# Patient Record
Sex: MALE | Race: BLACK OR AFRICAN AMERICAN | NOT HISPANIC OR LATINO | Employment: UNEMPLOYED | URBAN - METROPOLITAN AREA
[De-identification: names, ages, dates, MRNs, and addresses within clinical notes are randomized per-mention and may not be internally consistent; named-entity substitution may affect disease eponyms.]

---

## 2021-07-07 ENCOUNTER — MEDICAL CORRESPONDENCE (OUTPATIENT)
Dept: HEALTH INFORMATION MANAGEMENT | Facility: CLINIC | Age: 61
End: 2021-07-07

## 2021-07-07 ENCOUNTER — TRANSFERRED RECORDS (OUTPATIENT)
Dept: HEALTH INFORMATION MANAGEMENT | Facility: CLINIC | Age: 61
End: 2021-07-07

## 2021-07-08 ENCOUNTER — TRANSCRIBE ORDERS (OUTPATIENT)
Dept: OTHER | Age: 61
End: 2021-07-08

## 2021-07-08 DIAGNOSIS — G44.011 INTRACTABLE EPISODIC CLUSTER HEADACHE: ICD-10-CM

## 2021-07-08 DIAGNOSIS — R68.84 JAW PAIN: Primary | ICD-10-CM

## 2021-08-20 ENCOUNTER — HOSPITAL ENCOUNTER (EMERGENCY)
Facility: CLINIC | Age: 61
Discharge: HOME | End: 2021-08-20
Attending: EMERGENCY MEDICINE | Admitting: EMERGENCY MEDICINE
Payer: COMMERCIAL

## 2021-08-20 VITALS
SYSTOLIC BLOOD PRESSURE: 163 MMHG | DIASTOLIC BLOOD PRESSURE: 106 MMHG | HEART RATE: 98 BPM | OXYGEN SATURATION: 100 % | RESPIRATION RATE: 16 BRPM | TEMPERATURE: 96.6 F

## 2021-08-20 DIAGNOSIS — K08.89 PAIN, DENTAL: ICD-10-CM

## 2021-08-20 PROCEDURE — 99282 EMERGENCY DEPT VISIT SF MDM: CPT | Performed by: EMERGENCY MEDICINE

## 2021-08-20 ASSESSMENT — ENCOUNTER SYMPTOMS
NECK STIFFNESS: 0
DIFFICULTY URINATING: 0
EYE REDNESS: 0
SHORTNESS OF BREATH: 0
ARTHRALGIAS: 0
ABDOMINAL PAIN: 0
FEVER: 0
COLOR CHANGE: 0
CONFUSION: 0
HEADACHES: 1

## 2021-08-20 NOTE — DISCHARGE INSTRUCTIONS
Orajel or Anbesol to affected area. (You may obtain this from any pharmacy)  Tylenol or Ibuprofen for pain.  Use prescription medication as directed.  Follow up with your Dentist or a dental clinic listed below.    Many of these clinics offer a sliding fee option for patients that qualify, and see patients on a walk-in or same day basis. Please call each clinic directly. As services, hours, fees and policies vary greatly.    [place]:  [facility]     [phone]  [facility] ([facility]) [phone]  [facility]  [phone]  [facility]      [phone]   [facility]    [phone]  [facility]  [phone]  [facility] [phone]  [facility]     [phone]  [facility]   [phone]  [facility] (cash only)   [phone]  [facility]    [phone] (adults)          [phone] (children)    [place]:  [facility]     [phone]; [phone]  [facility]     [phone]  [facility]     [phone]  [facility] (free, limited)    [phone]    Multiple Locations:  [facility]       [phone]

## 2021-08-20 NOTE — ED PROVIDER NOTES
ED Provider Note  [facility]      History     Chief Complaint   Patient presents with     Dental Pain     3 weeks ago, molar removed. Went to clinic requesting other molar to be removed. Requesting tooth to be removed, right side pain.     The history is provided by the patient and medical records. A  was used (Official iPad German ).   Dental Pain  Associated symptoms: headaches    Associated symptoms: no congestion and no fever      Marcella Mac is a 61 year old male with a past medical history significant for hypertension, hyperlipidemia, prediabetes and depression who presents to the Emergency Department for evaluation of dental pain.  Patient states that 3 weeks ago he had one of his teeth removed.  He says he had an appointment today to take out another tooth but he was told there was no dentist available to remove his tooth.  He states he called 911 and was brought here to have his tooth removed.  He thinks he has inflammation around his tooth and he says he has had a lot of pain.  He says he has had tooth pain and a headache for 2 months.  He has taken ibuprofen and Tylenol for pain.  He states he wants his tooth to be extracted here in the ED.    Past Medical History  Past Medical History:   Diagnosis Date     Premature ejaculation      Past Surgical History:   Procedure Laterality Date     NO HISTORY OF SURGERY       IBUPROFEN PO      No Known Allergies  Family History  Family History   Family history unknown: Yes     Social History   Social History     Tobacco Use     Smoking status: Never Smoker     Smokeless tobacco: Never Used   Substance Use Topics     Alcohol use: No     Drug use: No      Past medical history, past surgical history, medications, allergies, family history, and social history were reviewed with the patient. No additional pertinent items.       Review of Systems   Constitutional: Negative for fever.   HENT: Negative for  congestion.         Positive for tooth pain   Eyes: Negative for redness.   Respiratory: Negative for shortness of breath.    Cardiovascular: Negative for chest pain.   Gastrointestinal: Negative for abdominal pain.   Genitourinary: Negative for difficulty urinating.   Musculoskeletal: Negative for arthralgias and neck stiffness.   Skin: Negative for color change.   Neurological: Positive for headaches.   Psychiatric/Behavioral: Negative for confusion.   All other systems reviewed and are negative.    A complete review of systems was performed with pertinent positives and negatives noted in the HPI, and all other systems negative.    Physical Exam   BP: (!) 163/106  Pulse: 98  Temp: (!) 96.6  F (35.9  C)  Resp: 16  SpO2: 100 %  Physical Exam  Constitutional:       General: He is not in acute distress.     Appearance: He is well-developed. He is not diaphoretic.   HENT:      Head: Normocephalic and atraumatic.      Mouth/Throat:     Eyes:      General: No scleral icterus.  Musculoskeletal:      Cervical back: Normal range of motion and neck supple.   Skin:     General: Skin is warm and dry.      Findings: No rash.   Neurological:      Mental Status: He is alert and oriented to person, place, and time.         ED Course     1:33 PM  The patient was seen and examined by Austen Mcduffie MD in Room ED02.   Procedures       The medical record was reviewed and interpreted.       No results found for any visits on 08/20/21.  Medications - No data to display     Assessments & Plan (with Medical Decision Making)   61-year-old male who presents for evaluation of right upper tooth pain for the past 2 months requesting extraction.  Differential includes caries, reversible versus irreversible pulpitis, alveolitis, dental abscess.  Exam reveals tooth #5 be rotted down to the gumline.  There is no fluctuance or obvious swelling.  Patient was quite angry that we could not extract his tooth in the emergency room.  He declined pain meds  medications and antibiotics.  He was given a list of dentists in the area.    I have reviewed the nursing notes. I have reviewed the findings, diagnosis, plan and need for follow up with the patient.    Discharge Medication List as of 8/20/2021  2:12 PM          Final diagnoses:   Pain, dental     I, Chetna Layton, am serving as a trained medical scribe to document services personally performed by Austen Mcduffie MD, based on the provider's statements to me.     I, Austen Mcduffie MD, was physically present and have reviewed and verified the accuracy of this note documented by Chetna Layton.  --  Austen Mcduffie MD  [facility] EMERGENCY DEPARTMENT  8/20/2021     Austen Mcduffie MD  08/20/21 1638

## 2022-12-30 ENCOUNTER — LAB REQUISITION (OUTPATIENT)
Dept: LAB | Facility: CLINIC | Age: 62
End: 2022-12-30
Payer: COMMERCIAL

## 2022-12-30 DIAGNOSIS — Z00.00 ENCOUNTER FOR GENERAL ADULT MEDICAL EXAMINATION WITHOUT ABNORMAL FINDINGS: ICD-10-CM

## 2022-12-30 LAB
ALBUMIN SERPL BCG-MCNC: 4.4 G/DL (ref 3.5–5.2)
ALP SERPL-CCNC: 89 U/L (ref 40–129)
ALT SERPL W P-5'-P-CCNC: 12 U/L (ref 10–50)
ANION GAP SERPL CALCULATED.3IONS-SCNC: 16 MMOL/L (ref 7–15)
AST SERPL W P-5'-P-CCNC: 25 U/L (ref 10–50)
BILIRUB SERPL-MCNC: 0.5 MG/DL
BUN SERPL-MCNC: 16.6 MG/DL (ref 8–23)
CALCIUM SERPL-MCNC: 9.6 MG/DL (ref 8.8–10.2)
CHLORIDE SERPL-SCNC: 102 MMOL/L (ref 98–107)
CHOLEST SERPL-MCNC: 306 MG/DL
CREAT SERPL-MCNC: 1.02 MG/DL (ref 0.67–1.17)
DEPRECATED HCO3 PLAS-SCNC: 22 MMOL/L (ref 22–29)
GFR SERPL CREATININE-BSD FRML MDRD: 84 ML/MIN/1.73M2
GLUCOSE SERPL-MCNC: 90 MG/DL (ref 70–99)
HDLC SERPL-MCNC: 64 MG/DL
LDLC SERPL CALC-MCNC: 221 MG/DL
NONHDLC SERPL-MCNC: 242 MG/DL
POTASSIUM SERPL-SCNC: 4 MMOL/L (ref 3.4–5.3)
PROT SERPL-MCNC: 7.6 G/DL (ref 6.4–8.3)
SODIUM SERPL-SCNC: 140 MMOL/L (ref 136–145)
TRIGL SERPL-MCNC: 103 MG/DL
TSH SERPL DL<=0.005 MIU/L-ACNC: 1.74 UIU/ML (ref 0.3–4.2)

## 2022-12-30 PROCEDURE — 84443 ASSAY THYROID STIM HORMONE: CPT | Performed by: INTERNAL MEDICINE

## 2022-12-30 PROCEDURE — 80053 COMPREHEN METABOLIC PANEL: CPT | Performed by: INTERNAL MEDICINE

## 2022-12-30 PROCEDURE — 80061 LIPID PANEL: CPT | Performed by: INTERNAL MEDICINE

## 2024-07-08 ENCOUNTER — MEDICAL CORRESPONDENCE (OUTPATIENT)
Dept: HEALTH INFORMATION MANAGEMENT | Facility: CLINIC | Age: 64
End: 2024-07-08

## 2024-07-08 ENCOUNTER — LAB REQUISITION (OUTPATIENT)
Dept: LAB | Facility: CLINIC | Age: 64
End: 2024-07-08
Payer: COMMERCIAL

## 2024-07-08 DIAGNOSIS — R73.03 PREDIABETES: ICD-10-CM

## 2024-07-08 DIAGNOSIS — I10 ESSENTIAL (PRIMARY) HYPERTENSION: ICD-10-CM

## 2024-07-08 LAB
ALBUMIN SERPL BCG-MCNC: 4.2 G/DL (ref 3.5–5.2)
ALP SERPL-CCNC: 95 U/L (ref 40–150)
ALT SERPL W P-5'-P-CCNC: 20 U/L (ref 0–70)
ANION GAP SERPL CALCULATED.3IONS-SCNC: 11 MMOL/L (ref 7–15)
AST SERPL W P-5'-P-CCNC: 23 U/L (ref 0–45)
BILIRUB SERPL-MCNC: 0.5 MG/DL
BUN SERPL-MCNC: 15.4 MG/DL (ref 8–23)
CALCIUM SERPL-MCNC: 9.3 MG/DL (ref 8.8–10.2)
CHLORIDE SERPL-SCNC: 105 MMOL/L (ref 98–107)
CHOLEST SERPL-MCNC: 245 MG/DL
CREAT SERPL-MCNC: 0.96 MG/DL (ref 0.67–1.17)
DEPRECATED HCO3 PLAS-SCNC: 25 MMOL/L (ref 22–29)
EGFRCR SERPLBLD CKD-EPI 2021: 89 ML/MIN/1.73M2
FASTING STATUS PATIENT QL REPORTED: NO
FASTING STATUS PATIENT QL REPORTED: NO
GLUCOSE SERPL-MCNC: 92 MG/DL (ref 70–99)
HDLC SERPL-MCNC: 60 MG/DL
LDLC SERPL CALC-MCNC: 155 MG/DL
NONHDLC SERPL-MCNC: 185 MG/DL
POTASSIUM SERPL-SCNC: 3.8 MMOL/L (ref 3.4–5.3)
PROT SERPL-MCNC: 7.3 G/DL (ref 6.4–8.3)
SODIUM SERPL-SCNC: 141 MMOL/L (ref 135–145)
TRIGL SERPL-MCNC: 152 MG/DL

## 2024-07-08 PROCEDURE — 80061 LIPID PANEL: CPT | Mod: ORL | Performed by: FAMILY MEDICINE

## 2024-07-08 PROCEDURE — 80053 COMPREHEN METABOLIC PANEL: CPT | Mod: ORL | Performed by: FAMILY MEDICINE

## 2024-07-09 ENCOUNTER — TRANSCRIBE ORDERS (OUTPATIENT)
Dept: OTHER | Age: 64
End: 2024-07-09

## 2024-07-09 DIAGNOSIS — H53.8 BLURRED VISION: Primary | ICD-10-CM

## 2024-07-29 ENCOUNTER — OFFICE VISIT (OUTPATIENT)
Dept: OPHTHALMOLOGY | Facility: CLINIC | Age: 64
End: 2024-07-29
Attending: STUDENT IN AN ORGANIZED HEALTH CARE EDUCATION/TRAINING PROGRAM
Payer: COMMERCIAL

## 2024-07-29 DIAGNOSIS — H02.88A MEIBOMIAN GLAND DYSFUNCTION (MGD) OF UPPER AND LOWER LIDS OF BOTH EYES: Primary | ICD-10-CM

## 2024-07-29 DIAGNOSIS — H52.201 HYPEROPIA OF RIGHT EYE WITH ASTIGMATISM AND PRESBYOPIA: ICD-10-CM

## 2024-07-29 DIAGNOSIS — H02.88B MEIBOMIAN GLAND DYSFUNCTION (MGD) OF UPPER AND LOWER LIDS OF BOTH EYES: Primary | ICD-10-CM

## 2024-07-29 DIAGNOSIS — H52.4 HYPEROPIA OF RIGHT EYE WITH ASTIGMATISM AND PRESBYOPIA: ICD-10-CM

## 2024-07-29 DIAGNOSIS — H52.12 MYOPIA OF LEFT EYE WITH ASTIGMATISM AND PRESBYOPIA: ICD-10-CM

## 2024-07-29 DIAGNOSIS — H52.01 HYPEROPIA OF RIGHT EYE WITH ASTIGMATISM AND PRESBYOPIA: ICD-10-CM

## 2024-07-29 DIAGNOSIS — H04.123 DRY EYES, BILATERAL: ICD-10-CM

## 2024-07-29 DIAGNOSIS — H52.202 MYOPIA OF LEFT EYE WITH ASTIGMATISM AND PRESBYOPIA: ICD-10-CM

## 2024-07-29 DIAGNOSIS — H52.4 MYOPIA OF LEFT EYE WITH ASTIGMATISM AND PRESBYOPIA: ICD-10-CM

## 2024-07-29 PROCEDURE — 92004 COMPRE OPH EXAM NEW PT 1/>: CPT | Performed by: STUDENT IN AN ORGANIZED HEALTH CARE EDUCATION/TRAINING PROGRAM

## 2024-07-29 PROCEDURE — G0463 HOSPITAL OUTPT CLINIC VISIT: HCPCS | Performed by: STUDENT IN AN ORGANIZED HEALTH CARE EDUCATION/TRAINING PROGRAM

## 2024-07-29 RX ORDER — AMLODIPINE BESYLATE 5 MG/1
5 TABLET ORAL DAILY
COMMUNITY
Start: 2022-12-30

## 2024-07-29 RX ORDER — LISINOPRIL 20 MG/1
1 TABLET ORAL DAILY
COMMUNITY
Start: 2024-06-13

## 2024-07-29 RX ORDER — CITALOPRAM HYDROBROMIDE 10 MG/1
1 TABLET ORAL DAILY
COMMUNITY
Start: 2024-07-08

## 2024-07-29 RX ORDER — PREDNISONE 20 MG/1
TABLET ORAL
COMMUNITY
Start: 2024-06-13

## 2024-07-29 ASSESSMENT — VISUAL ACUITY
OD_PH_SC: 20/80
OS_SC: 20/25
METHOD: SNELLEN - LINEAR
OS_SC+: -2
OD_PH_SC+: -1+1
OD_SC: 20/300

## 2024-07-29 ASSESSMENT — REFRACTION_MANIFEST
OD_CYLINDER: +2.75
OS_CYLINDER: +1.00
OS_SPHERE: -0.50
OD_AXIS: 143
OD_SPHERE: +3.75
OS_AXIS: 004

## 2024-07-29 ASSESSMENT — TONOMETRY
OS_IOP_MMHG: 13
IOP_METHOD: TONOPEN
OD_IOP_MMHG: 12

## 2024-07-29 ASSESSMENT — CONF VISUAL FIELD
OS_INFERIOR_TEMPORAL_RESTRICTION: 0
OD_NORMAL: 1
OD_SUPERIOR_NASAL_RESTRICTION: 0
OS_NORMAL: 1
OD_SUPERIOR_TEMPORAL_RESTRICTION: 0
OD_INFERIOR_NASAL_RESTRICTION: 0
METHOD: COUNTING FINGERS
OD_INFERIOR_TEMPORAL_RESTRICTION: 0
OS_INFERIOR_NASAL_RESTRICTION: 0
OS_SUPERIOR_NASAL_RESTRICTION: 0
OS_SUPERIOR_TEMPORAL_RESTRICTION: 0

## 2024-07-29 ASSESSMENT — SLIT LAMP EXAM - LIDS
COMMENTS: WNL
COMMENTS: WNL

## 2024-07-29 ASSESSMENT — CUP TO DISC RATIO: OD_RATIO: 0.2

## 2024-07-29 ASSESSMENT — EXTERNAL EXAM - LEFT EYE: OS_EXAM: WNL

## 2024-07-29 ASSESSMENT — EXTERNAL EXAM - RIGHT EYE: OD_EXAM: WNL

## 2024-07-29 NOTE — PROGRESS NOTES
HPI       Annual Eye Exam    Associated symptoms include tearing.  Negative for eye pain.  Treatments tried include no treatments.  Pain was noted as 0/10. Additional comments: Here for annual eye exam referral from [facility] clinic.             Comments    An Mohawk(Irish) iPad  was used for this visit.   Beginning 2-3 years ago, left eye watering when going outside.    No treatments attempted.  Denies eye pain or discomfort.   Pt reports slight blurry vision right eye which he feels may be cataracts.    Karlos Martini 9:34 AM July 29, 2024            Last edited by Karlos Martini on 7/29/2024  9:34 AM.          Review of systems for the eyes was negative other than the pertinent positives/negatives listed in the HPI.    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  Dry Eyes OU  - intermittent tearing OU, no itching; some mgd OU with patent appearing LL puncta  - warm compresses daily OU x 5-10 min each time  - ATs prn OU  - if no improvement can consider other options    Nuclear sclerotic cataract Right Eye  Combined form of age-related cataract left eye  - not bothering patient, though may benefit from cataract surgery OD  - observe    Corneal scar left eye  - old; no signs of infection  - observe    Possible refractive amblyopia of right eye  - notes poor vision right eye since childhood  - significant anisometropia on diagnostic Mrx    Hyperopia of right eye with astigmatism and presbyopia  Myopia of left eye with astigmatism and presbyopia  - defers glasses    Counseled return/RD/monocular precautions    Patient disposition:   Return in about 1 year (around 7/29/2025) for Annual  Visit, VTD MRx, or sooner changes.    Attending Physician Attestation:  Complete documentation of historical and exam elements from today's encounter can be found in the full encounter summary report (not reduplicated in this progress note).  I personally obtained the chief complaint(s) and history of present illness.  I confirmed and edited as necessary the review of systems, past medical/surgical history, family history, social history, and examination findings as documented by others; and I examined the patient myself.  I personally reviewed the relevant tests, images, and reports as documented above.  I formulated and edited as necessary the assessment and plan and discussed the findings and management plan with the patient and family. . - Candelaria Lester MD

## 2024-07-29 NOTE — NURSING NOTE
Chief Complaints and History of Present Illnesses   Patient presents with    Annual Eye Exam     Here for annual eye exam referral from [facility] clinic.     Chief Complaint(s) and History of Present Illness(es)       Annual Eye Exam              Associated symptoms: tearing.  Negative for eye pain    Treatments tried: no treatments    Pain scale: 0/10    Comments: Here for annual eye exam referral from [facility] clinic.              Comments    An Persian(Nigerien) iPad  was used for this visit.   Beginning 2-3 years ago, left eye watering when going outside.    No treatments attempted.  Denies eye pain or discomfort.   Pt reports slight blurry vision right eye which he feels may be cataracts.    Karlos Martini 9:34 AM July 29, 2024

## 2024-10-10 ENCOUNTER — LAB REQUISITION (OUTPATIENT)
Dept: LAB | Facility: CLINIC | Age: 64
End: 2024-10-10
Payer: COMMERCIAL

## 2024-10-10 DIAGNOSIS — M10.072 IDIOPATHIC GOUT, LEFT ANKLE AND FOOT: ICD-10-CM

## 2024-10-10 DIAGNOSIS — Z13.29 ENCOUNTER FOR SCREENING FOR OTHER SUSPECTED ENDOCRINE DISORDER: ICD-10-CM

## 2024-10-10 DIAGNOSIS — I10 ESSENTIAL (PRIMARY) HYPERTENSION: ICD-10-CM

## 2024-10-10 LAB
TSH SERPL DL<=0.005 MIU/L-ACNC: 1.23 UIU/ML (ref 0.3–4.2)
URATE SERPL-MCNC: 8.2 MG/DL (ref 3.4–7)

## 2024-10-10 PROCEDURE — 84443 ASSAY THYROID STIM HORMONE: CPT | Mod: ORL | Performed by: STUDENT IN AN ORGANIZED HEALTH CARE EDUCATION/TRAINING PROGRAM

## 2024-10-10 PROCEDURE — 84550 ASSAY OF BLOOD/URIC ACID: CPT | Mod: ORL | Performed by: STUDENT IN AN ORGANIZED HEALTH CARE EDUCATION/TRAINING PROGRAM
